# Patient Record
Sex: MALE | Race: BLACK OR AFRICAN AMERICAN
[De-identification: names, ages, dates, MRNs, and addresses within clinical notes are randomized per-mention and may not be internally consistent; named-entity substitution may affect disease eponyms.]

---

## 2019-06-20 NOTE — RAD
CHEST 2 VIEWS:

 

HISTORY: 

Chest pain.

 

FINDINGS: 

Heart size is normal.  The lungs are clear.  

 

IMPRESSION: 

No acute intrathoracic disease.

 

POS: [facility]

## 2019-06-22 NOTE — EKG
Test Reason : 

Blood Pressure : ***/*** mmHG

Vent. Rate : 049 BPM     Atrial Rate : 049 BPM

   P-R Int : 180 ms          QRS Dur : 118 ms

    QT Int : 390 ms       P-R-T Axes : 029 013 035 degrees

   QTc Int : 352 ms

 

Sinus bradycardia with sinus arrhythmia

Otherwise normal EKG

Confirmed by BAILEY GUERRERO DO ([id number]),  ALISHA WILLIS ([id number]) on 6/22/2019 5:10:39 PM

 

Referred By:             Confirmed By:BAILEY GUERRERO DO

## 2019-12-05 NOTE — RAD
2 view chest:

[12/5/2019]



Comparison:6/19/2019



HISTORY: Cough and congestion



FINDINGS: Heart and mediastinal contours are grossly unremarkable. No pneumothorax or pleural fluid. 
No focal consolidation or alveolar edema.



IMPRESSION: No acute findings.



Reported By: Robin Lora 

Electronically Signed:  12/5/2019 7:47 AM

## 2022-08-13 ENCOUNTER — HOSPITAL ENCOUNTER (EMERGENCY)
Dept: HOSPITAL 92 - ERS | Age: 27
Discharge: HOME | End: 2022-08-13
Payer: SELF-PAY

## 2022-08-13 DIAGNOSIS — M62.82: ICD-10-CM

## 2022-08-13 DIAGNOSIS — F17.210: ICD-10-CM

## 2022-08-13 DIAGNOSIS — T67.5XXA: Primary | ICD-10-CM

## 2022-08-13 LAB
ALBUMIN SERPL BCG-MCNC: 4.3 G/DL (ref 3.5–5)
ALP SERPL-CCNC: 49 U/L (ref 40–110)
ALT SERPL W P-5'-P-CCNC: 25 U/L (ref 8–55)
ANION GAP SERPL CALC-SCNC: 15 MMOL/L (ref 10–20)
AST SERPL-CCNC: 31 U/L (ref 5–34)
BASOPHILS # BLD AUTO: 0.1 THOU/UL (ref 0–0.2)
BASOPHILS NFR BLD AUTO: 1.2 % (ref 0–1)
BILIRUB SERPL-MCNC: 0.4 MG/DL (ref 0.2–1.2)
BUN SERPL-MCNC: 10 MG/DL (ref 8.9–20.6)
CALCIUM SERPL-MCNC: 9.5 MG/DL (ref 7.8–10.44)
CHLORIDE SERPL-SCNC: 103 MMOL/L (ref 98–107)
CK SERPL-CCNC: 1273 U/L (ref 30–200)
CO2 SERPL-SCNC: 25 MMOL/L (ref 22–29)
CREAT CL PREDICTED SERPL C-G-VRATE: 0 ML/MIN (ref 70–130)
EOSINOPHIL # BLD AUTO: 0.1 THOU/UL (ref 0–0.7)
EOSINOPHIL NFR BLD AUTO: 2.1 % (ref 0–10)
GLOBULIN SER CALC-MCNC: 3.9 G/DL (ref 2.4–3.5)
GLUCOSE SERPL-MCNC: 93 MG/DL (ref 70–105)
HGB BLD-MCNC: 14.4 G/DL (ref 14–18)
LYMPHOCYTES # BLD: 2.1 THOU/UL (ref 1.2–3.4)
LYMPHOCYTES NFR BLD AUTO: 39.7 % (ref 21–51)
MCH RBC QN AUTO: 31.2 PG (ref 27–31)
MCV RBC AUTO: 93.8 FL (ref 78–98)
MONOCYTES # BLD AUTO: 0.4 THOU/UL (ref 0.11–0.59)
MONOCYTES NFR BLD AUTO: 6.8 % (ref 0–10)
NEUTROPHILS # BLD AUTO: 2.7 THOU/UL (ref 1.4–6.5)
NEUTROPHILS NFR BLD AUTO: 50.2 % (ref 42–75)
PLATELET # BLD AUTO: 211 THOU/UL (ref 130–400)
POTASSIUM SERPL-SCNC: 3.8 MMOL/L (ref 3.5–5.1)
RBC # BLD AUTO: 4.63 MILL/UL (ref 4.7–6.1)
SODIUM SERPL-SCNC: 139 MMOL/L (ref 136–145)
WBC # BLD AUTO: 5.4 THOU/UL (ref 4.8–10.8)

## 2022-08-13 PROCEDURE — 85025 COMPLETE CBC W/AUTO DIFF WBC: CPT

## 2022-08-13 PROCEDURE — 82550 ASSAY OF CK (CPK): CPT

## 2022-08-13 PROCEDURE — 80053 COMPREHEN METABOLIC PANEL: CPT

## 2022-08-13 PROCEDURE — 36415 COLL VENOUS BLD VENIPUNCTURE: CPT
